# Patient Record
Sex: FEMALE | Race: WHITE | Employment: UNEMPLOYED | URBAN - METROPOLITAN AREA
[De-identification: names, ages, dates, MRNs, and addresses within clinical notes are randomized per-mention and may not be internally consistent; named-entity substitution may affect disease eponyms.]

---

## 2021-04-29 ENCOUNTER — TELEPHONE (OUTPATIENT)
Dept: OTHER | Age: 61
End: 2021-04-29

## 2021-04-29 NOTE — TELEPHONE ENCOUNTER
Pt name and  verified.     Pt calling for lab results    Please call pt  Back    [phone] (home)     Nona Yap

## 2021-04-30 NOTE — TELEPHONE ENCOUNTER
Just as we discussed at length at the appointment. Use of NSAIDs religiously for a couple of weeks and consider PT. I told her that the plastic wear was starting and most likely the culprit and at some point we would need to change that out, but I didn't think it was urgent or needed to be done at this time.   Margie Royal MD